# Patient Record
Sex: FEMALE | URBAN - METROPOLITAN AREA
[De-identification: names, ages, dates, MRNs, and addresses within clinical notes are randomized per-mention and may not be internally consistent; named-entity substitution may affect disease eponyms.]

---

## 2021-12-15 ENCOUNTER — NURSE TRIAGE (OUTPATIENT)
Dept: OTHER | Facility: OTHER | Age: 81
End: 2021-12-15

## 2021-12-15 DIAGNOSIS — Z20.822 ENCOUNTER FOR SCREENING LABORATORY TESTING FOR COVID-19 VIRUS: Primary | ICD-10-CM

## 2021-12-15 PROCEDURE — 87636 SARSCOV2 & INF A&B AMP PRB: CPT | Performed by: FAMILY MEDICINE

## 2021-12-18 ENCOUNTER — TELEPHONE (OUTPATIENT)
Dept: URGENT CARE | Facility: CLINIC | Age: 81
End: 2021-12-18

## 2021-12-20 ENCOUNTER — TELEPHONE (OUTPATIENT)
Dept: OTHER | Facility: OTHER | Age: 81
End: 2021-12-20

## 2021-12-21 ENCOUNTER — TELEPHONE (OUTPATIENT)
Dept: OTHER | Facility: OTHER | Age: 81
End: 2021-12-21

## 2025-01-31 ENCOUNTER — APPOINTMENT (EMERGENCY)
Dept: CT IMAGING | Facility: HOSPITAL | Age: 85
End: 2025-01-31
Payer: MEDICARE

## 2025-01-31 ENCOUNTER — APPOINTMENT (EMERGENCY)
Dept: RADIOLOGY | Facility: HOSPITAL | Age: 85
End: 2025-01-31
Payer: MEDICARE

## 2025-01-31 ENCOUNTER — HOSPITAL ENCOUNTER (EMERGENCY)
Facility: HOSPITAL | Age: 85
Discharge: HOME | End: 2025-01-31
Attending: EMERGENCY MEDICINE
Payer: MEDICARE

## 2025-01-31 ENCOUNTER — RESULTS FOLLOW-UP (OUTPATIENT)
Dept: EMERGENCY DEPT | Facility: HOSPITAL | Age: 85
End: 2025-01-31

## 2025-01-31 VITALS
DIASTOLIC BLOOD PRESSURE: 92 MMHG | SYSTOLIC BLOOD PRESSURE: 150 MMHG | TEMPERATURE: 97.5 F | HEART RATE: 78 BPM | RESPIRATION RATE: 16 BRPM | OXYGEN SATURATION: 97 % | WEIGHT: 162.04 LBS
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
## 2025-01-31 DIAGNOSIS — S62.209A: ICD-10-CM

## 2025-01-31 DIAGNOSIS — S00.83XA CONTUSION OF FACE, INITIAL ENCOUNTER: ICD-10-CM

## 2025-01-31 PROCEDURE — 72125 CT NECK SPINE W/O DYE: CPT

## 2025-01-31 PROCEDURE — 73140 X-RAY EXAM OF FINGER(S): CPT

## 2025-01-31 PROCEDURE — 70450 CT HEAD/BRAIN W/O DYE: CPT

## 2025-01-31 PROCEDURE — 29125 APPL SHORT ARM SPLINT STATIC: CPT | Performed by: EMERGENCY MEDICINE

## 2025-01-31 PROCEDURE — 70486 CT MAXILLOFACIAL W/O DYE: CPT

## 2025-01-31 PROCEDURE — 99285 EMERGENCY DEPT VISIT HI MDM: CPT | Performed by: EMERGENCY MEDICINE

## 2025-01-31 PROCEDURE — 99284 EMERGENCY DEPT VISIT MOD MDM: CPT

## 2025-01-31 RX ORDER — ACETAMINOPHEN 325 MG/1
650 TABLET ORAL ONCE
Status: COMPLETED | OUTPATIENT
Start: 2025-01-31 | End: 2025-01-31

## 2025-01-31 RX ORDER — GINSENG 100 MG
1 CAPSULE ORAL ONCE
Status: COMPLETED | OUTPATIENT
Start: 2025-01-31 | End: 2025-01-31

## 2025-01-31 RX ADMIN — BACITRACIN 1 SMALL APPLICATION: 500 OINTMENT TOPICAL at 09:05

## 2025-01-31 RX ADMIN — ACETAMINOPHEN 325MG 650 MG: 325 TABLET ORAL at 10:06

## 2025-01-31 NOTE — ED PROVIDER NOTES
Emergency Department Trauma Note  Candy Read 84 y.o. female MRN: [id number]  Unit/Bed#: ED TR 13/TR 13A Encounter: [id number]      Trauma Alert: Trauma Acuity: Trauma Evaluation  Model of Arrival: Mode of Arrival: BLS via    Trauma Team: Current Providers  Attending Provider: Cj Whitmore DO  Registered Nurse: Soila Rutledge RN  Consultants:     None      History of Present Illness     Chief Complaint:   Chief Complaint   Patient presents with    Fall     Pt slipped outside causing her to fall and hit the left side of her head. +asa. Trauma eval called  0815     HPI:  Candy Read is a 84 y.o. female who presents with .  Mechanism:Details of Incident: slipped and fell outside today. +HS, no loc, + ASA          84-year-old female to the emergency room with chief complaint of head injury.  Patient experienced a fall on ice today while attempting to ambulate to her car.  Landed on her left side.  Struck the left side of her face.  No loss of consciousness.  Patient was made a trauma evaluation secondary to her taking an aspirin this morning.  Patient has no nausea vomiting.  Denies any abdominal pain.  Patient has some mild right thumb pain.  Has chronic deformity to her extremity secondary to chronic arthritis.  Denies any chest pain.  No abdominal pain.  No lower extremity pain.  No back pain.      History provided by:  Patient  Fall  Mechanism of injury: fall    Incident location:  Outdoors  Fall:     Fall occurred:  Walking    Impact surface:  Ice  Tetanus status: Patient reports allergy to tetanus immunization.  Prior to arrival data:     Loss of consciousness: no      Amnesic to event: no      Airway condition since incident:  Stable    Breathing condition since incident:  Stable    Circulation condition since incident:  Stable    Mental status condition since incident:  Stable    Disability condition since incident:  Stable  Associated symptoms: no abdominal pain, no back pain, no chest pain, no  headaches, no loss of consciousness, no neck pain and no vomiting    Risk factors: no anticoagulation therapy      Review of Systems   Cardiovascular:  Negative for chest pain.   Gastrointestinal:  Negative for abdominal pain and vomiting.   Musculoskeletal:  Negative for back pain and neck pain.   Neurological:  Negative for loss of consciousness and headaches.       Historical Information     Immunizations:   There is no immunization history on file for this patient.    No past medical history on file.  No family history on file.  No past surgical history on file.     No existing history information found.  No existing history information found.    Family History: non-contributory    Meds/Allergies   None       Allergies   Allergen Reactions    Aspirin Swelling    Other Other (See Comments)     Facial Flushing    Penicillins Other (See Comments)     unknown    Tetanus Toxoids Other (See Comments)    Valacyclovir Rash       PHYSICAL EXAM        Objective   Vitals:   First set: Temperature: 97.5 °F (36.4 °C) (01/31/25 0815)  Pulse: 85 (01/31/25 0815)  Respirations: 16 (01/31/25 0815)  Blood Pressure: 151/92 (01/31/25 0815)  SpO2: 97 % (01/31/25 0815)    Primary Survey:   (A) Airway: Normal  (B) Breathing: Normal  (C) Circulation: Pulses:   normal  (D) Disabliity:  GCS Total:  15  (E) Expose:  Completed      Secondary Survey: (Click on Physical Exam tab above)  Physical Exam  Vitals and nursing note reviewed.   Constitutional:       General: She is not in acute distress.     Appearance: She is normal weight. She is not ill-appearing or toxic-appearing.   HENT:      Head: Normocephalic. Abrasion and contusion present.      Jaw: No trismus, tenderness or malocclusion.        Right Ear: External ear normal.      Left Ear: External ear normal.      Nose: Nose normal.      Mouth/Throat:      Mouth: Mucous membranes are moist.   Neck:      Trachea: Trachea normal.   Cardiovascular:      Rate and Rhythm: Normal rate.    Pulmonary:      Effort: Pulmonary effort is normal. No respiratory distress.      Breath sounds: No wheezing.   Abdominal:      General: Abdomen is flat. There is no distension.   Musculoskeletal:         General: Tenderness and signs of injury present.      Right hand: Tenderness present. No swelling or deformity.        Hands:       Cervical back: Full passive range of motion without pain. No pain with movement, spinous process tenderness or muscular tenderness. Normal range of motion.   Skin:     Coloration: Skin is not pale.   Neurological:      General: No focal deficit present.      Mental Status: She is alert.   Psychiatric:         Mood and Affect: Mood normal.         Cervical spine cleared by clinical criteria? Yes     Invasive Devices       None                   Lab Results:   Results Reviewed       None                   Imaging Studies:   Direct to CT: Yes  XR finger third digit-middle LEFT   ED Interpretation by Cj Whitmore DO (01/31 1010)   Marked degenerative changes without obvious fracture.      XR thumb first digit-min 2 views RIGHT   ED Interpretation by Cj Whitmore DO (01/31 0921)   Fracture base of first metacarpal      TRAUMA - CT head wo contrast   Final Result by Aysha Young MD (01/31 0855)   Addendum (preliminary) 1 of 1 by Aysha Young MD (01/31 0855)   ADDENDUM:      Soft tissue hematoma in the left zygomatic and maxillary region      Final      No acute intracranial abnormality.   No acute intracranial hemorrhage seen   No mass effect or midline shift seen               Workstation performed: [id number]         TRAUMA - CT spine cervical wo contrast   Final Result by Aysha Young MD (01/31 0854)   No acute compression collapse of the vertebra.   There is multilevel degenerative disc disease with central canal stenosis at C4-5, C5-6 and multilevel foraminal narrowing                  Workstation performed: [id number]         TRAUMA - CT facial bones wo contrast  "  Final Result by Aysha Young MD (01/31 0910)   No acute displaced fractures      Soft tissue hematoma in the left malar and maxillary region      Chronic sinus disease                     Workstation performed: [id number]               Procedures  Orthopedic injury treatment    Date/Time: 1/31/2025 9:34 AM    Performed by: Cj Whitmore DO  Authorized by: Cj Whitmore DO    Patient Location:  ED  [place] Protocol:  procedure performed by consultantConsent: Verbal consent obtained.  Risks and benefits: risks, benefits and alternatives were discussed  Consent given by: patient  Time out: Immediately prior to procedure a \"time out\" was called to verify the correct patient, procedure, equipment, support staff and site/side marked as required.  Timeout called at: 1/31/2025 9:34 AM.  Patient understanding: patient states understanding of the procedure being performed  Required items: required blood products, implants, devices, and special equipment available    Injury location:  Hand  Location details:  Right hand  Injury type:  Fracture  Fracture type: first metacarpal    Neurovascular status: Neurovascularly intact    Distal perfusion: normal    Neurological function: normal    Range of motion: normal    Local anesthesia used?: No    Manipulation performed?: No    Immobilization:  Splint  Splint type:  Thumb spica (Static splint)  Supplies used:  Cotton padding, elastic bandage and Ortho-Glass  Neurovascular status: Neurovascularly intact    Distal perfusion: normal    Range of motion: unchanged    Patient tolerance:  Patient tolerated the procedure well with no immediate complications           ED Course  ED Course as of 01/31/25 1046   Fri Jan 31, 2025   0920 CT of head is negative for acute fracture or injury.   0920 CT of spine is negative.   0921 Fracture right thumb.  Will place in splint.   0933 Thumb spica splint placed on right hand   1010 Middle finger of left hand reveals no obvious fracture.  " Marked degenerative changes.  Patient will be discharged to follow-up with orthopedics/hand surgery.           Medical Decision Making  Patient presented to the emergency department and a MSE was performed. The patient was evaluated for complaint related to acute traumatic injury.  Patient is potentially at risk for, but not limited to, acute head injury including intracranial hemorrhage, subdural or epidural hematoma, cervical spine injury, concussion, skull fracture, or posttraumatic headache.  Several of these diagnoses have been evaluated and ruled out by history and physical.  As needed, patient will be further evaluated with laboratory and imaging studies.  Higher level diagnostics, such as CT imaging or ultrasound, may also be required.  Please see work-up portion of the note for further evaluation of patient's risk.  Socioeconomic factors were also considered as part of the decision-making process.  Unless otherwise stated in the chart or patient is admitted as elsewhere documented, any previously prescribed medications will be maintained.     Chest x-ray and pelvic films were not performed as this was isolated head    Patient deemed stable to proceed to any necessary CT imaging.    Cervical spine was cleared with clinical exam and imaging      Problems Addressed:  Contusion of face, initial encounter: acute illness or injury  Fall, initial encounter: acute illness or injury  Fracture of first metacarpal: acute illness or injury     Details: Patient referred to orthopedics for follow-up.    Amount and/or Complexity of Data Reviewed  Radiology: ordered and independent interpretation performed. Decision-making details documented in ED Course.    Risk  OTC drugs.                Disposition  [facility] Transfer: No  Final diagnoses:   Fall, initial encounter   Contusion of face, initial encounter   Fracture of first metacarpal     Time reflects when diagnosis was documented in both MDM as applicable and the  Disposition within this note       Time User Action Codes Description Comment    1/31/2025  9:22 AM Cj Whitmore [W19.XXXA] Fall, initial encounter     1/31/2025  9:22 AM Cj Whitmore [S00.83XA] Contusion of face, initial encounter     1/31/2025  9:22 AM Cj Whitmore [S62.209A] Fracture of first metacarpal           ED Disposition       ED Disposition   Discharge    Condition   Stable    Date/Time   Fri Jan 31, 2025  9:22 AM    Comment   Candy Read discharge to home/self care.                   Follow-up Information       Follow up With Specialties Details Why Contact Info Additional Information    [facility]   [facility] [address] [phone] [facility] [place], [address] [place] [address], [facility], [address], [place], [address] [phone]          There are no discharge medications for this patient.        PDMP Review       None            ED Provider  Electronically Signed by           Cj Whitmore,   01/31/25 1016       Cj Whitmore,   01/31/25 1046

## 2025-01-31 NOTE — DISCHARGE INSTRUCTIONS
Use Tylenol as needed for pain..  We recommend ice 4-6 times a day for 15 to 20 minutes at a time to the affected area.  Try and leave the area elevated to help reduce swelling and pain.  Return with any worsening, particularly increased pain, severe swelling, or any other symptomatology that seems concerning.    Leave splint in place until seen by orthopedics as scheduled.  Return with any worsening.    Your eye may swell further.  You may apply ice to the area as discussed above.  Return for any skin that appears to be peeling or blistering.

## 2025-01-31 NOTE — ED NOTES
Bacitracin applied to left eye brow, left knee. Bandages applied. Ice pack given for home. Pt wheeled to car with daughter. Both verbalized discharge understanding.      Soila Rutledge RN  01/31/25 2124

## 2025-02-07 ENCOUNTER — OFFICE VISIT (OUTPATIENT)
Dept: OBGYN CLINIC | Facility: CLINIC | Age: 85
End: 2025-02-07
Payer: MEDICARE

## 2025-02-07 VITALS — WEIGHT: 156.8 LBS | HEIGHT: 66 IN | BODY MASS INDEX: 25.2 KG/M2

## 2025-02-07 DIAGNOSIS — S62.209A: Primary | ICD-10-CM

## 2025-02-07 PROCEDURE — 29075 APPL CST ELBW FNGR SHORT ARM: CPT | Performed by: STUDENT IN AN ORGANIZED HEALTH CARE EDUCATION/TRAINING PROGRAM

## 2025-02-07 PROCEDURE — 99204 OFFICE O/P NEW MOD 45 MIN: CPT | Performed by: STUDENT IN AN ORGANIZED HEALTH CARE EDUCATION/TRAINING PROGRAM

## 2025-02-07 RX ORDER — MENTHOL 100 MG/G
CREAM TOPICAL
COMMUNITY

## 2025-02-07 RX ORDER — BETAMETHASONE DIPROPIONATE 0.5 MG/G
CREAM TOPICAL 2 TIMES DAILY
COMMUNITY

## 2025-02-07 RX ORDER — ATORVASTATIN CALCIUM 10 MG/1
10 TABLET, FILM COATED ORAL DAILY
COMMUNITY

## 2025-02-07 RX ORDER — ACETAMINOPHEN 500 MG
500 TABLET ORAL EVERY 6 HOURS PRN
COMMUNITY

## 2025-02-07 RX ORDER — LISINOPRIL 20 MG/1
20 TABLET ORAL DAILY
COMMUNITY

## 2025-02-07 RX ORDER — WITCH HAZEL 2 G/1
CLOTH TOPICAL
COMMUNITY

## 2025-02-07 NOTE — PROGRESS NOTES
ASSESSMENT/PLAN:    Assessment:   The patient is a 84-year-old female presenting with nondisplaced fractures of the right thumb proximal phalanx base as well as the metacarpal proximal metadiaphysis.  I have discussed the nature of these fractures with the patient.  We discussed that then since they are nondisplaced she is a good candidate for conservative management.  She was placed in a thumb spica cast today.  I discussed that fractures take 6 weeks to heal and therefore we will immobilize her for 4 to 6 weeks.    The patient verbalized understanding of exam findings and treatment plan. We engaged in the shared decision-making process and treatment options were discussed at length with the patient. Surgical and conservative management discussed today along with risks and benefits.      Plan:   I had a discussion with the patient regarding my clinical findings, diagnosis, and treatment plan.  All questions answered.  Reviewed previous A1C.  Xrays of left third digit and right thumb were obtained and reviewed in the office today.   Recommend immobilization with thumb spica cast for the next four weeks.   Cast care instructions provided.   Elevate and maintain nonweightbearing of right upper extremity   OTC NSAIDs/tylenol for pain management  Next Visit:  Return in about 4 weeks (around 3/7/2025)., xrays right thumb       _____________________________________________________  CHIEF COMPLAINT:  Right thumb injury      SUBJECTIVE:  Candy Read is a 84 y.o. right hand dominant female presenting for evaluation of right thumb injury. The patient states the injury occurred while walking; patient slipped on ice and fell. After injury he was initially evaluated by the ED.  They were placed in a splint and referred for follow-up.  Patient reports the pain in right thumb has been well controlled. Patient reports the splint has been uncomfortable and caused skin irritation. Patient denies any pain in the left hand. Here to  "establish care.    DOI: 1/31/2025      PAST MEDICAL HISTORY:  No past medical history on file.    PAST SURGICAL HISTORY:  No past surgical history on file.    FAMILY HISTORY:  No family history on file.    SOCIAL HISTORY:       MEDICATIONS:    Current Outpatient Medications:     Cholecalciferol 25 MCG (1000 UT) CHEW, Chew, Disp: , Rfl:     COCONUT OIL PO, Take 4,000 mg by mouth daily, Disp: , Rfl:     Multiple Vitamin (MULTIVITAMIN PO), 1 tablet daily, Disp: , Rfl:     ALLERGIES:  Allergies   Allergen Reactions    Aspirin Swelling    Other Other (See Comments)     Facial Flushing    Penicillins Other (See Comments) and Swelling     unknown    Tetanus Toxoids Other (See Comments)    Valacyclovir Rash       REVIEW OF SYSTEMS:  Pertinent items are noted in HPI.  A comprehensive review of systems was negative.    LABS:  HgA1c:   Lab Results   Component Value Date    HGBA1C 6.1 (H) 11/23/2019     BMP:   Lab Results   Component Value Date    CALCIUM 8.9 11/23/2019    K 4.1 11/23/2019    CO2 24 11/23/2019     11/23/2019    BUN 15 11/23/2019    CREATININE 0.6 11/23/2019         _____________________________________________________  PHYSICAL EXAMINATION:  Vital signs: Ht 5' 6\" (1.676 m)   Wt 71.1 kg (156 lb 12.8 oz)   BMI 25.31 kg/m²   General: well developed and well nourished, alert, oriented times 3, and appears comfortable  Psychiatric: Normal  HEENT: Trachea Midline, No torticollis  Cardiovascular: No discernable arrhythmia  Pulmonary: No wheezing or stridor  Abdomen: No rebound or guarding  Extremities: No peripheral edema  Skin: No masses, erythema, lacerations, fluctation, ulcerations  Neurovascular: Sensation Intact to the Median, Ulnar, Radial Nerve, Motor Intact to the Median, Ulnar, Radial Nerve, and Pulses Intact    MUSCULOSKELETAL EXAMINATION:  Right hand  Skin intact. Swelling and irritation noted on the dorsal aspect of the thumb.  Hyperextension deformity noted at the MP joint.  No rotational " deformity noted.  Range of Motion:  Elbow: extension/flexion intact  Forearm: pronation/supination intact  Wrist: extension/flexion intact  Motor Exam: firing AIN/PIN/U  Sensory Exam: Sensation intact to light touch in FDWS (radial), volar IF (median), volar SF (ulnar)  Vascular Exam: < 2 sec capillary refill     _____________________________________________________  STUDIES REVIEWED:  I reviewed imaging in PACS from 1/31/2025 of the right first digit which demonstrates fracture of the first metacarpal with questionable nondisplaced fracture of proximal phalanx.     I reviewed imaging in PACS from 1/31/2025 of the left third digit which demonstrates severe degenerative changes of the IP joints and questionable fracture of the proximal phalanx of the left middle finger.     PROCEDURES PERFORMED:  Cast application    Date/Time: 2/7/2025 11:00 AM    Performed by: Valentín Valencia MD  Authorized by: Valentín Valencia MD    Verbal consent obtained?: Yes    Risks and benefits: Risks, benefits and alternatives were discussed    Consent given by:  Patient  Patient states understanding of procedure being performed: Yes    Patient identity confirmed:  Verbally with patient  Procedure details:     Laterality:  Right    Location:  Hand    Cast type:  Short arm    Spint composition: static      Supplies:  3 layer wrap, skin protective strip, cotton padding and fiberglass  Post-procedure details:     Patient tolerance of procedure:  Tolerated well, no immediate complications        Scribe Attestation      I,:  Emy Leon PA-C am acting as a scribe while in the presence of the attending physician.:       I,:  Valentín Valencia MD personally performed the services described in this documentation    as scribed in my presence.:

## 2025-02-07 NOTE — PATIENT INSTRUCTIONS
Cast and Splint Care    Splints and casts are supports that are used to protect injured bones and soft tissues. A cast completely encircles the limb with a hard, rigid outer shell. A splint provides rigid support along just the side(s) of the limb, with soft or open areas in between. Splints are often used in the immediate post-operative or injury phase, when there is a greater chance of swelling. A splint can better allow for swelling. Your doctor will decide which type of support is most appropriate for you and your arm condition.    Materials  Casts are made with plaster or 'fiberglass' to form the hard supportive layer, and a soft lining of cotton or similar material for padding. Fiberglass is lighter, more durable, and “breathes” better than plaster. Plaster is less expensive and shapes better than fiberglass for some uses. Both materials come in strips and rolls, and are dipped in water to start the setting process. Most casts have a layer of padding underneath the hard material. X-rays can be taken through casts, but they do block some of the x-ray detail.    Splints can be made with these same materials or with plastic, fabric, or padded aluminum. They can be custom-made or pre-made. They come in a variety of shapes and sizes to meet specific needs. They often have Velcro straps, which makes them easier to take on and off.    For fiberglass casts, water-compatible padding does exist; ask your doctor if they have it available. Not all injuries can be treated with a waterproof cast. Waterproof casts can be submerged. It is best to avoid water from lakes, rivers and oceans when wearing a waterproof cast because your skin can become irritated if dirt or sand gets inside the cast. When you come in contact with chlorinated water or dirty water, rinse the cast with fresh water when you are done swimming. Allow the inside of the cast to drain as much as possible after it gets wet. If you have a cast that goes past  your elbow, be sure to drain the area around the elbow well. The rest of the water will evaporate. Sweat will not harm the cast liner, but consider rinsing the cast with clean water after excessive sweating.    Swelling  Swelling due to your injury or surgery is usually the worst during the first 2 or 3 days. Swelling can cause pressure in your splint or cast, making it feel tighter. To help avoid or reduce swelling, you should put your hand and arm above your heart by propping it up on pillows or some other support if possible. Elevation helps gravity to drain the blood and fluid that causes swelling out of your hand and arm. Elevation can also decrease pain. If swelling increases too much, a cast or splint can become too tight.    The following signs and symptoms should be watched for and, if they occur, you should contact your doctor promptly for advice:   worsening pain   numbness and tingling in your hand or fingers, which may indicate excessive pressure on the nerves   burning and stinging, which may result from too much pressure on the skin   excessive swelling of the hand, which may mean the veins are being blocked   loss of active movement of your fingers, which may indicate muscle damage    Sometimes a cast may need to be changed if the cast is too tight or if it gets too loose when the swelling goes down.    Cast and Splint Care  Keep your cast/splint clean and dry. Being in contact with damp padding can irritate your skin. Plaster gets softer and weaker when it gets wet. Use plastic bags or a waterproof cast cover to keep your splint or cast dry when bathing. Seal the bag with tape or rubber bands. Elevate your hand in the shower above your head, because otherwise water can still run under the seal and into your cast. Do not keep it constantly covered because moisture may build up from normal sweating. Do not let dirt, sand, or other materials get inside of your splint or cast. If you feel itchy, do not  place anything inside your cast because you can injure your skin; instead, ask your doctor for advice. Never trim the cast or splint by yourself. If there are rough edges or if your skin gets irritated around the edges of the cast, notify your doctor, who has the proper tools to fix it. If the cast or splint develops cracks or soft spots, contact your doctor to see if it needs to be repaired or changed.    Cast Removal  Never try to remove a cast yourself; you may cut your skin or prevent proper healing of your injury. A cast should be removed only by a professional with the proper tools and training. Casts are removed with a special type of saw that will not cut your skin.    Remember, a cast is there to protect you while your injury heals. It is only a temporary inconvenience, with the goal of helping you recover.              © 2012 [employer]  www.handcare.org

## 2025-03-13 ENCOUNTER — HOSPITAL ENCOUNTER (OUTPATIENT)
Dept: RADIOLOGY | Facility: CLINIC | Age: 85
End: 2025-03-13
Payer: MEDICARE

## 2025-03-13 ENCOUNTER — OFFICE VISIT (OUTPATIENT)
Dept: OBGYN CLINIC | Facility: CLINIC | Age: 85
End: 2025-03-13
Payer: MEDICARE

## 2025-03-13 VITALS — BODY MASS INDEX: 24.75 KG/M2 | WEIGHT: 154 LBS | HEIGHT: 66 IN

## 2025-03-13 DIAGNOSIS — S62.209D: Primary | ICD-10-CM

## 2025-03-13 DIAGNOSIS — S62.209D: ICD-10-CM

## 2025-03-13 PROCEDURE — 99213 OFFICE O/P EST LOW 20 MIN: CPT | Performed by: STUDENT IN AN ORGANIZED HEALTH CARE EDUCATION/TRAINING PROGRAM

## 2025-03-13 PROCEDURE — 73140 X-RAY EXAM OF FINGER(S): CPT

## 2025-03-13 NOTE — PROGRESS NOTES
ASSESSMENT/PLAN:    Assessment:   The patient is a 84-year-old female presenting for follow-up evaluation of nondisplaced fractures of the right thumb proximal phalanx base as well as the metacarpal proximal metadiaphysis. Reviewed the nature of these fractures with the patient. Thumb spica cast was removed in office today. Recommend patient to wear removable thumb spica brace for 2-3 weeks. Discussed that brace should be removed when at rest and for daily range of motion exercises.     The patient verbalized understanding of exam findings and treatment plan. We engaged in the shared decision-making process and treatment options were discussed at length with the patient. Surgical and conservative management discussed today along with risks and benefits.      Plan:   Discussed clinical findings, diagnosis, and treatment plan.  All questions answered.    Reviewed previous A1C.  Xrays of the right thumb were obtained and reviewed in the office today.   Recommend immobilization with thumb spica brace for the next 2 weeks with activities and at night, otherwise coming out of it for range of motion exercises.  Range of motion as tolerated  Elevate right upper extremity    She can begin with gradual weightbearing of 5 pounds. She can increase this to 5 pounds per week and be weightbearing as tolerated at 20 pounds limit reached.  OTC NSAIDs/tylenol for pain management  Next Visit:  Return in about 6 weeks (around 4/24/2025).       _____________________________________________________  CHIEF COMPLAINT:  Right thumb injury      SUBJECTIVE:  Candy Read is a 84 y.o. right hand dominant female presenting for follow-up evaluation of her right thumb. The patient states the injury occurred while walking; patient slipped on ice and fell. Patient was last seen in the office on 2/7/2025, where a thumb spica cast was applied and conservative measures were recommended. Reports the pain in right thumb has been well controlled. Reports  mild irritation wear the cast was rubbing.     DOI: 1/31/2025      PAST MEDICAL HISTORY:  History reviewed. No pertinent past medical history.    PAST SURGICAL HISTORY:  History reviewed. No pertinent surgical history.    FAMILY HISTORY:  History reviewed. No pertinent family history.    SOCIAL HISTORY:  Social History     Tobacco Use    Smoking status: Never    Smokeless tobacco: Never   Vaping Use    Vaping status: Never Used   Substance Use Topics    Alcohol use: Never    Drug use: Never       MEDICATIONS:    Current Outpatient Medications:     acetaminophen (TYLENOL) 500 mg tablet, Take 500 mg by mouth every 6 (six) hours as needed for mild pain, Disp: , Rfl:     atorvastatin (LIPITOR) 10 mg tablet, Take 10 mg by mouth daily, Disp: , Rfl:     betamethasone dipropionate (DIPROSONE) 0.05 % cream, Apply topically 2 (two) times a day, Disp: , Rfl:     Cholecalciferol 25 MCG (1000 UT) CHEW, Chew, Disp: , Rfl:     lisinopril (ZESTRIL) 20 mg tablet, Take 20 mg by mouth daily, Disp: , Rfl:     Menthol, Topical Analgesic, (Biofreeze) 10 % CREA, Apply topically, Disp: , Rfl:     Witch Hazel (Preparation H for Women) 20 % PADS, Apply topically, Disp: , Rfl:     COCONUT OIL PO, Take 4,000 mg by mouth daily (Patient not taking: Reported on 3/13/2025), Disp: , Rfl:     Multiple Vitamin (MULTIVITAMIN PO), 1 tablet daily (Patient not taking: Reported on 3/13/2025), Disp: , Rfl:     ALLERGIES:  Allergies   Allergen Reactions    Aspirin Swelling    Corticosteroids Itching    Other Other (See Comments)     Facial Flushing    Penicillins Other (See Comments) and Swelling     unknown    Tetanus Toxoids Other (See Comments)    Valacyclovir Rash       REVIEW OF SYSTEMS:  Pertinent items are noted in HPI.  A comprehensive review of systems was negative.    LABS:  HgA1c:   Lab Results   Component Value Date    HGBA1C 6.1 (H) 11/23/2019     BMP:   Lab Results   Component Value Date    CALCIUM 8.9 11/23/2019    K 4.1 11/23/2019    CO2 24  "11/23/2019     11/23/2019    BUN 15 11/23/2019    CREATININE 0.6 11/23/2019         _____________________________________________________  PHYSICAL EXAMINATION:  Vital signs: Ht 5' 6\" (1.676 m)   Wt 69.9 kg (154 lb)   BMI 24.86 kg/m²   General: well developed and well nourished, alert, oriented times 3, and appears comfortable  Psychiatric: Normal  HEENT: Trachea Midline, No torticollis  Cardiovascular: No discernable arrhythmia  Pulmonary: No wheezing or stridor  Abdomen: No rebound or guarding  Extremities: No peripheral edema  Skin: No masses, erythema, lacerations, fluctation, ulcerations  Neurovascular: Sensation Intact to the Median, Ulnar, Radial Nerve, Motor Intact to the Median, Ulnar, Radial Nerve, and Pulses Intact    MUSCULOSKELETAL EXAMINATION:  Right hand  Skin intact.   No swelling and irritation noted   Hyperextension deformity noted at the MP joint.    No rotational deformity noted.  No tenderness about the thumb including over the base of the thumb  Range of Motion:  Elbow: extension/flexion intact  Forearm: pronation/supination intact  Wrist: extension/flexion intact  Motor Exam: firing AIN/PIN/U  Sensory Exam: Sensation intact to light touch in FDWS (radial), volar IF (median), volar SF (ulnar)  Vascular Exam: < 2 sec capillary refill     _____________________________________________________  STUDIES REVIEWED:  I reviewed imaging in PACS from 1/31/2025 of the right first digit which demonstrates fracture of the first metacarpal with questionable nondisplaced fracture of proximal phalanx.     X-rays of the right thumb, performed 3/13/2025, demonstrate stable alignment of the thumb metacarpal base with interval fracture healing      PROCEDURES PERFORMED:  Procedures  None performed today      Scribe Attestation      I,:  Laquita Torrez PA-C am acting as a scribe while in the presence of the attending physician.:       I,:  Valentín Valencia MD personally performed the services " described in this documentation    as scribed in my presence.:

## 2025-03-14 PROBLEM — S62.209D: Status: ACTIVE | Noted: 2025-03-14
